# Patient Record
Sex: MALE | Race: WHITE | NOT HISPANIC OR LATINO | Employment: STUDENT | URBAN - METROPOLITAN AREA
[De-identification: names, ages, dates, MRNs, and addresses within clinical notes are randomized per-mention and may not be internally consistent; named-entity substitution may affect disease eponyms.]

---

## 2023-11-09 ENCOUNTER — TELEPHONE (OUTPATIENT)
Dept: GASTROENTEROLOGY | Facility: CLINIC | Age: 23
End: 2023-11-09
Payer: COMMERCIAL

## 2023-12-15 RX ORDER — FLUOXETINE HYDROCHLORIDE 20 MG/1
20 CAPSULE ORAL DAILY
COMMUNITY
End: 2023-12-20

## 2023-12-15 RX ORDER — DOCUSATE SODIUM 100 MG/1
CAPSULE, LIQUID FILLED ORAL
COMMUNITY
Start: 2023-05-09

## 2023-12-15 RX ORDER — CYPROHEPTADINE HYDROCHLORIDE 4 MG/1
8 TABLET ORAL NIGHTLY
COMMUNITY
End: 2024-04-30 | Stop reason: SDUPTHER

## 2023-12-18 ENCOUNTER — OFFICE VISIT (OUTPATIENT)
Dept: PEDIATRICS | Facility: CLINIC | Age: 23
End: 2023-12-18
Payer: COMMERCIAL

## 2023-12-18 VITALS
HEART RATE: 84 BPM | WEIGHT: 137.6 LBS | DIASTOLIC BLOOD PRESSURE: 85 MMHG | HEIGHT: 67 IN | SYSTOLIC BLOOD PRESSURE: 129 MMHG | BODY MASS INDEX: 21.6 KG/M2

## 2023-12-18 DIAGNOSIS — Z00.00 WELLNESS EXAMINATION: Primary | ICD-10-CM

## 2023-12-18 PROBLEM — R63.0 POOR APPETITE: Status: ACTIVE | Noted: 2023-12-18

## 2023-12-18 PROBLEM — J30.9 ALLERGIC RHINITIS: Status: ACTIVE | Noted: 2023-12-18

## 2023-12-18 PROBLEM — F32.A DEPRESSION: Status: ACTIVE | Noted: 2023-12-18

## 2023-12-18 PROBLEM — T78.3XXA ANGIOEDEMA OF LIPS: Status: ACTIVE | Noted: 2023-12-18

## 2023-12-18 PROBLEM — R62.7 POOR WEIGHT GAIN IN ADULT: Status: ACTIVE | Noted: 2023-12-18

## 2023-12-18 PROBLEM — E73.9 LACTOSE INTOLERANCE: Status: ACTIVE | Noted: 2023-12-18

## 2023-12-18 PROBLEM — F41.9 ANXIETY: Status: ACTIVE | Noted: 2023-12-18

## 2023-12-18 PROBLEM — Z91.010 PEANUT ALLERGY: Status: ACTIVE | Noted: 2023-12-18

## 2023-12-18 PROCEDURE — 3008F BODY MASS INDEX DOCD: CPT | Performed by: PEDIATRICS

## 2023-12-18 PROCEDURE — 1036F TOBACCO NON-USER: CPT | Performed by: PEDIATRICS

## 2023-12-18 PROCEDURE — 90471 IMMUNIZATION ADMIN: CPT | Performed by: PEDIATRICS

## 2023-12-18 PROCEDURE — 90686 IIV4 VACC NO PRSV 0.5 ML IM: CPT | Performed by: PEDIATRICS

## 2023-12-18 PROCEDURE — 99395 PREV VISIT EST AGE 18-39: CPT | Performed by: PEDIATRICS

## 2023-12-18 PROCEDURE — 91322 SARSCOV2 VAC 50 MCG/0.5ML IM: CPT | Performed by: PEDIATRICS

## 2023-12-18 PROCEDURE — 90480 ADMN SARSCOV2 VAC 1/ONLY CMP: CPT | Performed by: PEDIATRICS

## 2023-12-18 RX ORDER — CETIRIZINE HYDROCHLORIDE 10 MG/1
10 TABLET ORAL DAILY
COMMUNITY

## 2023-12-18 NOTE — PATIENT INSTRUCTIONS
Diagnoses and all orders for this visit:  Wellness examination  -     Flu vaccine (IIV4) age 6 months and greater, preservative free  -     Moderna COVID-19 vaccine, 8122-3514, monovalent, age 12 years and older, (50mcg/0.5mL)  BMI 21.0-21.9, adult       Assessment/Plan   Well adult.  1. Anticipatory guidance discussed.     2.  Weight management:  The patient was counseled regarding physical activity.  3. Development: appropriate for age   4. No orders of the defined types were placed in this encounter.      5. Follow-up visit in 1 year for next well child visit, or sooner as needed.

## 2023-12-18 NOTE — PROGRESS NOTES
Subjective   History was provided by jimmie  22yo who is here for this well visit.       Immunization History   Administered Date(s) Administered    DTaP vaccine, pediatric  (INFANRIX) 2002, 2002, 2002, 10/30/2003, 04/20/2006    Flu vaccine (IIV4), preservative free *Check age/dose* 11/30/2020    HPV, Quadrivalent 06/04/2014, 08/06/2014, 12/10/2014    Hepatitis A vaccine, age 19 years and greater (HAVRIX) 08/18/2021    Hepatitis A vaccine, pediatric/adolescent (HAVRIX, VAQTA) 07/13/2020    Hepatitis B vaccine, adult (RECOMBIVAX, ENGERIX) 2002, 2002, 01/20/2003    HiB PRP-T conjugate vaccine (HIBERIX, ACTHIB) 2002, 2002, 2002, 10/30/2003    MMR vaccine, subcutaneous (MMR II) 07/25/2003, 04/20/2006    Meningococcal ACWY vaccine (MENVEO) 07/08/2019    Meningococcal B vaccine (BEXSERO) 07/13/2020, 08/12/2020    Meningococcal MCV4P 08/06/2014    PPD Test 08/12/2020    Pneumococcal conjugate vaccine, 13-valent (PREVNAR 13) 2002, 2002, 04/21/2003    Poliovirus vaccine, subcutaneous (IPOL) 2002, 07/25/2003, 10/30/2003, 04/20/2006    Tdap vaccine, age 7 year and older (BOOSTRIX) 06/04/2014    Varicella vaccine, subcutaneous (VARIVAX) 04/21/2003, 04/23/2009             History of previous adverse reactions to immunizations? no  The following portions of the patient's history were reviewed by a provider in this encounter and updated as appropriate:  Tobacco  Allergies  Meds  Problems  Med Hx  Surg Hx  Fam Hx     Concerns: sick for a few days- cold- getting better.  Well  Assessment:  22yo lives with family   Nutrition  Types of intake include vegetables, fruits, meats, cow's milk and cereals.   Dental  The patient has a dental home. The patient brushes teeth regularly. The patient flosses regularly. Last dental exam was less than 6 months ago.   Elimination  Elimination problems do not include constipation, diarrhea or urinary symptoms. There is no bed  "wetting.   Behavioral  Behavioral issues do not include misbehaving with siblings.   Sleep  Average sleep duration is 7 hours. The patient does not snore. There are no sleep problems.   Safety  There is no smoking in the home. Home has working smoke alarms? yes. Home has working carbon monoxide alarms? yes.   School  Current grade level is college- [employer].  There are no signs of learning disabilities. Child is doing well in school.   Screening  There are no risk factors at school. There are no risk factors related to alcohol. There are no risk factors related to drugs. There are no risk factors related to personal safety. There are no risk factors related to tobacco.   Social  Sibling interactions are good.     Fun: hang out with friends, video games               Objective   Vitals   /85 (BP Location: Right arm, BP Cuff Size: Adult)   Pulse 84   Ht 1.702 m (5' 7\")   Wt 62.4 kg (137 lb 9.6 oz)   BMI 21.55 kg/m²       Growth parameters are noted and are appropriate for age.   Physical Exam  Constitutional:       Appearance: Normal appearance. He is normal weight.   HENT:      Head: Normocephalic and atraumatic.      Right Ear: Tympanic membrane normal.      Left Ear: Tympanic membrane normal.      Nose: Nose normal.      Mouth/Throat:      Mouth: Mucous membranes are moist.   Eyes:      Extraocular Movements: Extraocular movements intact.      Conjunctiva/sclera: Conjunctivae normal.      Pupils: Pupils are equal, round, and reactive to light.   Cardiovascular:      Rate and Rhythm: Normal rate and regular rhythm.      Heart sounds: Normal heart sounds.   Pulmonary:      Breath sounds: Normal breath sounds.   Abdominal:      General: Abdomen is flat. Bowel sounds are normal.      Palpations: Abdomen is soft.   Genitourinary:     Penis: Normal.       Testes: Normal.   Musculoskeletal:         General: Normal range of motion.      Cervical back: Normal range of motion and neck supple.   Skin:     General: Skin " is warm and dry.   Neurological:      General: No focal deficit present.      Mental Status: He is alert and oriented to person, place, and time. Mental status is at baseline.              Diagnoses and all orders for this visit:  Wellness examination  -     Flu vaccine (IIV4) age 6 months and greater, preservative free  -     Moderna COVID-19 vaccine, 9983-6312, monovalent, age 12 years and older, (50mcg/0.5mL)  BMI 21.0-21.9, adult       Assessment/Plan   Well adult.  1. Anticipatory guidance discussed.     2.  Weight management:  The patient was counseled regarding physical activity.  3. Development: appropriate for age   4. No orders of the defined types were placed in this encounter.      5. Follow-up visit in 1 year for next well child visit, or sooner as needed.

## 2023-12-19 DIAGNOSIS — F41.9 ANXIETY DISORDER, UNSPECIFIED: ICD-10-CM

## 2023-12-20 RX ORDER — FLUOXETINE HYDROCHLORIDE 20 MG/1
20 CAPSULE ORAL DAILY
Qty: 90 CAPSULE | Refills: 3 | Status: SHIPPED | OUTPATIENT
Start: 2023-12-20 | End: 2024-01-05 | Stop reason: SDUPTHER

## 2024-01-05 ENCOUNTER — TELEPHONE (OUTPATIENT)
Dept: PEDIATRICS | Facility: CLINIC | Age: 24
End: 2024-01-05
Payer: COMMERCIAL

## 2024-01-05 DIAGNOSIS — F41.9 ANXIETY DISORDER, UNSPECIFIED: ICD-10-CM

## 2024-01-05 RX ORDER — FLUOXETINE HYDROCHLORIDE 20 MG/1
40 CAPSULE ORAL DAILY
Qty: 180 CAPSULE | Refills: 3 | Status: SHIPPED | OUTPATIENT
Start: 2024-01-05 | End: 2024-06-03 | Stop reason: WASHOUT

## 2024-02-09 ENCOUNTER — TELEPHONE (OUTPATIENT)
Dept: PEDIATRICS | Facility: CLINIC | Age: 24
End: 2024-02-09
Payer: COMMERCIAL

## 2024-02-12 ENCOUNTER — OFFICE VISIT (OUTPATIENT)
Dept: PEDIATRICS | Facility: CLINIC | Age: 24
End: 2024-02-12
Payer: COMMERCIAL

## 2024-02-12 VITALS
WEIGHT: 137 LBS | DIASTOLIC BLOOD PRESSURE: 76 MMHG | HEART RATE: 106 BPM | BODY MASS INDEX: 21.46 KG/M2 | SYSTOLIC BLOOD PRESSURE: 119 MMHG

## 2024-02-12 DIAGNOSIS — F41.9 ANXIETY: Primary | ICD-10-CM

## 2024-02-12 PROCEDURE — 99213 OFFICE O/P EST LOW 20 MIN: CPT | Performed by: PEDIATRICS

## 2024-02-12 PROCEDURE — 1036F TOBACCO NON-USER: CPT | Performed by: PEDIATRICS

## 2024-02-12 PROCEDURE — 3008F BODY MASS INDEX DOCD: CPT | Performed by: PEDIATRICS

## 2024-02-12 RX ORDER — ESCITALOPRAM OXALATE 10 MG/1
10 TABLET ORAL DAILY
Qty: 30 TABLET | Refills: 2 | Status: SHIPPED | OUTPATIENT
Start: 2024-02-12 | End: 2024-03-05

## 2024-02-12 NOTE — PROGRESS NOTES
Subjective   Cruz Del Cid a 23 y.o.malewho presents forAnxiety (23 yr old here to discuss Prozac. Recently increased dose to 20 mg and does not like it, wanting to try another medication. /)  HPI    Increased fluoxetine over the phone and has been having bad panic attacks.not fun- change?  Eating and sleeping ok overall.  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     Objective   /76 (BP Location: Left arm, BP Cuff Size: Adult)   Pulse 106   Wt 62.1 kg (137 lb)   BMI 21.46 kg/m²       Physical Exam    General: Well-developed, well-nourished, alert and oriented, no acute distress  Eyes: Normal sclera, PERRLA, EOMI  ENT: Moist mucous membranes,  normal throat, no nasal discharge. TMs are normal.  Cardiac:  Normal S1/S2, no murmurs, regular rhythm. Capillary refill less than 2 seconds  Pulmonary: Clear to auscultation bilaterally, no work of breathing.  GI: normal abdomen without localization and without rebound or guarding.  Skin: No rashes  Neuro: Symmetric face, no ataxia, grossly normal strength.  Lymph: No lymphadenopathy         No visits with results within 10 Day(s) from this visit.   Latest known visit with results is:   [facility] Encounter on 07/17/2018   Component Date Value Ref Range Status    Pathology Report 07/17/2018    Final                    Value:Name CRUZ JORDAN                                                                                                   Accession #: [id number]            Pathologist:                   SIDDHARTHA YADAV MD  Date of Procedure:    7/17/2018  Date Received:          7/17/2018  Date Reported           7/18/2018  Submitting Physician:   ALPHONSE JETT MD  Location:                      Other External #                                                                    FINAL DIAGNOSIS  A.  ESOPHAGUS, BIOPSY:    --NO SIGNIFICANT HISTOPATHOLOGIC CHANGE.  --NEGATIVE FOR INTRAEPITHELIAL EOSINOPHILS.    B.  STOMACH,  "BIOPSY:  --REACTIVE (CHEMICAL) GASTROPATHY.  --NEGATIVE FOR HELICOBACTER PYLORI-LIKE ORGANISMS BY MORPHOLOGY.    C.  DUODENUM, BIOPSY:    --NORMAL VILLOUS ARCHITECTURE WITH NO SIGNIFICANT HISTOPATHOLOGIC CHANGE.    D.  TERMINAL ILEUM, BIOPSY:    --NORMAL VILLOUS ARCHITECTURE WITH NO SIGNIFICANT HISTOPATHOLOGIC CHANGE.    E.  COLON, RIGHT, BIOPSY:    --NO SIGNIFICANT HISTOPATHOL                          OGIC CHANGE.    F.  COLON, TRANSVERSE, BIOPSY:    --NO SIGNIFICANT HISTOPATHOLOGIC CHANGE.    G.  COLON, LEFT, BIOPSY:    --NO SIGNIFICANT HISTOPATHOLOGIC CHANGE.    H.  RECTUM, BIOPSY:  --NO SIGNIFICANT HISTOPATHOLOGIC CHANGE.                                                                                                                                                                                                                                                                                                                                                                                                                                                                                       Electronically Signed Out By SIDDHARTHA YADAV MD/STS  By the signature on this report, the individual or group listed as making the  Final Interpretation/Diagnosis certifies that they have reviewed this case.           Clinical History:  h/o ab.pain, weight > normal EGD/ colon    Specimens Submitted                           As:  A: E-ESOPHAGUS   B: G-GASTRIC   C: D-DUODENUM   D: TI-TERMINAL ILEUM   E: RC-RIGHT COLON   F: TC-TRANSVERSE COLON   G: LC-LEFT COLON   H: R-RECTUM     Gross Description:  A:  Received in formalin, labeled with the patient's name and hospital number  and \"E \", are 2 fragments of white, soft tissue aggregating to 0.2 x 0.1 x 0.1  cm.  The specimen is submitted in toto in one cassette.  EXL    B:  Received in formalin, labeled with the patient's name and hospital number  and \"G \", are multiple fragments of " "tan, soft tissue aggregating to 0.5 x 0.1 x  0.1 cm.  The specimen is submitted in toto in one cassette.  EXL    C:  Received in formalin, labeled with the patient's name and hospital number  and \"D \", are 2 fragments of tan, soft tissue aggregating to 0.3 x 0.1 x 0.1  cm.  The specimen is submitted in toto in one cassette.  EXL    D:  Received in formalin, labeled with the patient's name and hospital number  and \"TI \", is a fragment of tan, soft tissue measuring 0.3 x 0.1 x 0.1 c                          m.  The  specimen is submitted in toto in one cassette.  EXL    E:  Received in formalin, labeled with the patient's name and hospital number  and \"RC \", is a fragment of tan, soft tissue measuring 0.3 x 0.1 x 0.1 cm.  The  specimen is submitted in toto in one cassette.  EXL    F:  Received in formalin, labeled with the patient's name and hospital number  and \"TC \", are 2 fragments of tan, soft tissue aggregating to 0.2 x 0.1 x 0.1  cm.  The specimen is submitted in toto in one cassette.  EXL    G:  Received in formalin, labeled with the patient's name and hospital number  and \"LC \", are 2 fragments of tan, soft tissue aggregating to 0.4 x 0.1 x 0.1  cm.  The specimen is submitted in toto in one cassette.  EXL    H:  Received in formalin, labeled with the patient's name and hospital number  and \"R \", are 2 fragments of tan, soft tissue aggregating to 0.2 x 0.1 x 0.1  cm.  The specimen is submitted in toto in one cassette.  EXL    exl/7/17/2018               [facility]  Department of Pathology   [address] [place] [address]        CONVERTED FINAL DIAGNOSIS 07/17/2018    Final                    Value:A.  ESOPHAGUS, BIOPSY:    --NO SIGNIFICANT HISTOPATHOLOGIC CHANGE.  --NEGATIVE FOR INTRAEPITHELIAL EOSINOPHILS.    B.  STOMACH, BIOPSY:  --REACTIVE (CHEMICAL) GASTROPATHY.  --NEGATIVE FOR HELICOBACTER PYLORI-LIKE ORGANISMS BY MORPHOLOGY.    C.  " "DUODENUM, BIOPSY:    --NORMAL VILLOUS ARCHITECTURE WITH NO SIGNIFICANT HISTOPATHOLOGIC CHANGE.    D.  TERMINAL ILEUM, BIOPSY:    --NORMAL VILLOUS ARCHITECTURE WITH NO SIGNIFICANT HISTOPATHOLOGIC CHANGE.    E.  COLON, RIGHT, BIOPSY:    --NO SIGNIFICANT HISTOPATHOLOGIC CHANGE.    F.  COLON, TRANSVERSE, BIOPSY:    --NO SIGNIFICANT HISTOPATHOLOGIC CHANGE.    G.  COLON, LEFT, BIOPSY:    --NO SIGNIFICANT HISTOPATHOLOGIC CHANGE.    H.  RECTUM, BIOPSY:  --NO SIGNIFICANT HISTOPATHOLOGIC CHANGE.        CONVERTED CLINICAL DIAGNOSIS-HISTO* 07/17/2018 h/o ab.pain, weight > normal EGD/ colon   Final    CONVERTED GROSS DESCRIPTION 07/17/2018    Final                    Value:A:  Received in formalin, labeled with the patient's name and hospital number  and \"E \", are 2 fragments of white, soft tissue aggregating to 0.2 x 0.1 x 0.1  cm.  The specimen is submitted in toto in one cassette.  EXL    B:  Received in formalin, labeled with the patient's name and hospital number  and \"G \", are multiple fragments of tan, soft tissue aggregating to 0.5 x 0.1 x  0.1 cm.  The specimen is submitted in toto in one cassette.  EXL    C:  Received in formalin, labeled with the patient's name and hospital number  and \"D \", are 2 fragments of tan, soft tissue aggregating to 0.3 x 0.1 x 0.1  cm.  The specimen is submitted in toto in one cassette.  EXL    D:  Received in formalin, labeled with the patient's name and hospital number  and \"TI \", is a fragment of tan, soft tissue measuring 0.3 x 0.1 x 0.1 cm.  The  specimen is submitted in toto in one cassette.  EXL    E:  Received in formalin, labeled with the patient's name and hospital number  and \"RC \", is a fragment of tan, s                          oft tissue measuring 0.3 x 0.1 x 0.1 cm.  The  specimen is submitted in toto in one cassette.  EXL    F:  Received in formalin, labeled with the patient's name and hospital number  and \"TC \", are 2 fragments of tan, soft tissue aggregating to 0.2 x 0.1 x " "0.1  cm.  The specimen is submitted in toto in one cassette.  EXL    G:  Received in formalin, labeled with the patient's name and hospital number  and \"LC \", are 2 fragments of tan, soft tissue aggregating to 0.4 x 0.1 x 0.1  cm.  The specimen is submitted in toto in one cassette.  EXL    H:  Received in formalin, labeled with the patient's name and hospital number  and \"R \", are 2 fragments of tan, soft tissue aggregating to 0.2 x 0.1 x 0.1  cm.  The specimen is submitted in toto in one cassette.  EXL      CONVERTED FINAL REPORT PDF LINK TO* 07/17/2018 \\[id number]   Final         Assessment/Plan   Diagnoses and all orders for this visit:  Anxiety  -     escitalopram (Lexapro) 10 mg tablet; Take 1 tablet (10 mg) by mouth once daily.    "

## 2024-02-12 NOTE — PATIENT INSTRUCTIONS
Diagnoses and all orders for this visit:  Anxiety  -     escitalopram (Lexapro) 10 mg tablet; Take 1 tablet (10 mg) by mouth once daily.

## 2024-04-24 PROBLEM — K59.2 CONSTIPATION DUE TO NEUROGENIC BOWEL: Status: ACTIVE | Noted: 2024-04-24

## 2024-04-24 PROBLEM — K59.00 CONSTIPATION DUE TO NEUROGENIC BOWEL: Status: ACTIVE | Noted: 2024-04-24

## 2024-04-29 NOTE — PROGRESS NOTES
REASON FOR VISIT:  Constipation     HPI:  Cruz Ramirez is a 24 y.o. male who presents for 1 year follow up     Overall feeling well   Currently moving bowels once daily - denies straining , feels like fully evacuating  Denies hematochezia, melena, or mucus   Denies N/V   Weight has been fluctuating between 130-135 over the last 3-4 months   Appetite has improved          Med list notable for cyproheptadine 8mg daily     Labs notable for    No fhx of CRC.       Prev endoscopic eval:   EGD/Colon 2018     REVIEW OF SYSTEMS    Review of Systems   Constitutional: Negative for decreased appetite.   Cardiovascular:  Negative for chest pain.   Respiratory:  Negative for cough and shortness of breath.    Gastrointestinal:  Negative for bloating, abdominal pain, constipation, diarrhea, dysphagia, flatus, heartburn, hematemesis, hematochezia, hemorrhoids, jaundice, melena, nausea and vomiting.          Allergies   Allergen Reactions    Amoxicillin-Pot Clavulanate Unknown    Cefprozil Unknown       Past Medical History:   Diagnosis Date    Myopia, unspecified eye 07/12/2016    Axial myopia    Other specified health status     No pertinent past medical history    Unspecified astigmatism, unspecified eye 07/12/2016    Astigmatism       Past Surgical History:   Procedure Laterality Date    OTHER SURGICAL HISTORY  07/16/2014    Dental Surgery       No family history on file.    Social History     Tobacco Use    Smoking status: Never    Smokeless tobacco: Never   Substance Use Topics    Alcohol use: Not on file       Current Outpatient Medications   Medication Sig Dispense Refill    cetirizine (ZyrTEC) 10 mg tablet Take 1 tablet (10 mg) by mouth once daily.      cyproheptadine (Periactin) 4 mg tablet Take 2 tablets (8 mg) by mouth once daily at bedtime.      docusate sodium (Colace) 100 mg capsule Take by mouth.      escitalopram (Lexapro) 10 mg tablet TAKE 1 TABLET BY MOUTH EVERY DAY 90 tablet 0    FLUoxetine (PROzac) 20 mg  capsule Take 2 capsules (40 mg) by mouth once daily. 180 capsule 3     No current facility-administered medications for this visit.       PHYSICAL EXAM:  There were no vitals taken for this visit.     Physical Exam  Constitutional:       Comments: Awake   HENT:      Head: Normocephalic.   Cardiovascular:      Rate and Rhythm: Normal rate and regular rhythm.   Pulmonary:      Effort: Pulmonary effort is normal.      Breath sounds: Normal breath sounds.   Abdominal:      General: Bowel sounds are normal.      Palpations: Abdomen is soft.   Neurological:      Mental Status: He is alert.   Psychiatric:         Mood and Affect: Mood normal.          ASSESSMENT  24-year-old male presents for annual follow-up for longstanding abdominal pain, constipation, poor appetite.  He was previously followed by pediatric GI and was started on cyproheptadine (Periactin) for his symptoms which seem to have managed them over the years.  He reports a daily bowel movement denies any side effects or abdominal pain.  His weight has been stable per his home scale but has been noted to be variable on our scales.  His last visit a year ago he was documented at 150 pounds and today he is 130 pounds.  He denies any changes in his symptoms but reports he is working out more.  This point we will continue with his current medications and I will see him in follow-up in 1 year or sooner if needed      Evaluation requested by Dr. Arpan Burch MD.   My final recommendations will be communicated back to the requesting physician by way of shared Medical record or letter to requesting physician via fax.      Attending Note:   I have personally performed a face to face assessment of this Patient, which included an interview, physical exam and Assessment and Plan.  I have reviewed and confirmed the history and key findings as documented by the Medical Assistant and edited as appropriate.     Signature: Varsha Leung MD    Date: 4/29/2024  Time: 1:45  PM

## 2024-04-30 ENCOUNTER — OFFICE VISIT (OUTPATIENT)
Dept: GASTROENTEROLOGY | Facility: CLINIC | Age: 24
End: 2024-04-30
Payer: COMMERCIAL

## 2024-04-30 VITALS
HEART RATE: 92 BPM | WEIGHT: 130 LBS | DIASTOLIC BLOOD PRESSURE: 67 MMHG | SYSTOLIC BLOOD PRESSURE: 115 MMHG | HEIGHT: 67 IN | BODY MASS INDEX: 20.4 KG/M2 | RESPIRATION RATE: 16 BRPM | OXYGEN SATURATION: 98 % | TEMPERATURE: 97.7 F

## 2024-04-30 DIAGNOSIS — K59.00 CONSTIPATION DUE TO NEUROGENIC BOWEL: Primary | ICD-10-CM

## 2024-04-30 DIAGNOSIS — K59.2 CONSTIPATION DUE TO NEUROGENIC BOWEL: Primary | ICD-10-CM

## 2024-04-30 PROCEDURE — 99213 OFFICE O/P EST LOW 20 MIN: CPT | Performed by: INTERNAL MEDICINE

## 2024-04-30 PROCEDURE — 1036F TOBACCO NON-USER: CPT | Performed by: INTERNAL MEDICINE

## 2024-04-30 PROCEDURE — 3008F BODY MASS INDEX DOCD: CPT | Performed by: INTERNAL MEDICINE

## 2024-04-30 RX ORDER — CYPROHEPTADINE HYDROCHLORIDE 4 MG/1
8 TABLET ORAL NIGHTLY
Qty: 180 TABLET | Refills: 3 | Status: SHIPPED | OUTPATIENT
Start: 2024-04-30 | End: 2025-04-30

## 2024-04-30 ASSESSMENT — ENCOUNTER SYMPTOMS
CONSTIPATION: 0
DIARRHEA: 0
DECREASED APPETITE: 0
BLOATING: 0
FLATUS: 0
HEMATOCHEZIA: 0
VOMITING: 0
NAUSEA: 0
COUGH: 0
HEMATEMESIS: 0
JAUNDICE: 0
ABDOMINAL PAIN: 0
HEARTBURN: 0
SHORTNESS OF BREATH: 0

## 2024-06-03 ENCOUNTER — TELEPHONE (OUTPATIENT)
Dept: PEDIATRICS | Facility: CLINIC | Age: 24
End: 2024-06-03
Payer: COMMERCIAL

## 2024-06-03 DIAGNOSIS — F41.9 ANXIETY: ICD-10-CM

## 2024-06-03 RX ORDER — ESCITALOPRAM OXALATE 10 MG/1
10 TABLET ORAL DAILY
Qty: 90 TABLET | Refills: 0 | Status: SHIPPED | OUTPATIENT
Start: 2024-06-03

## 2024-08-28 DIAGNOSIS — F41.9 ANXIETY: ICD-10-CM

## 2024-08-28 RX ORDER — ESCITALOPRAM OXALATE 10 MG/1
10 TABLET ORAL DAILY
Qty: 90 TABLET | Refills: 0 | Status: SHIPPED | OUTPATIENT
Start: 2024-08-28

## 2024-11-12 ENCOUNTER — OFFICE VISIT (OUTPATIENT)
Dept: PEDIATRICS | Facility: CLINIC | Age: 24
End: 2024-11-12
Payer: COMMERCIAL

## 2024-11-12 VITALS
HEART RATE: 79 BPM | TEMPERATURE: 98.2 F | DIASTOLIC BLOOD PRESSURE: 75 MMHG | SYSTOLIC BLOOD PRESSURE: 114 MMHG | WEIGHT: 134.8 LBS | BODY MASS INDEX: 21.16 KG/M2 | HEIGHT: 67 IN

## 2024-11-12 DIAGNOSIS — K52.9 ACUTE GASTROENTERITIS: ICD-10-CM

## 2024-11-12 DIAGNOSIS — R52 GENERALIZED BODY ACHES: Primary | ICD-10-CM

## 2024-11-12 PROCEDURE — 3008F BODY MASS INDEX DOCD: CPT | Performed by: PEDIATRICS

## 2024-11-12 PROCEDURE — 99214 OFFICE O/P EST MOD 30 MIN: CPT | Performed by: PEDIATRICS

## 2024-11-12 PROCEDURE — 87636 SARSCOV2 & INF A&B AMP PRB: CPT

## 2024-11-12 RX ORDER — ONDANSETRON 4 MG/1
4 TABLET, ORALLY DISINTEGRATING ORAL EVERY 8 HOURS PRN
Qty: 20 TABLET | Refills: 0 | Status: SHIPPED | OUTPATIENT
Start: 2024-11-12 | End: 2024-11-19

## 2024-11-12 NOTE — PROGRESS NOTES
"Subjective   Patient ID: Cruz Ramirez is a 24 y.o. male.    HPI  History obtained from patient. Here today with abd pain and body aches. Symptoms started two days ago with diarrhea. He had several episodes yesterday. Used imodium with relief. He has some nausea but not vomiting. No fevers. Eating less and drinking ok. Now has some congestion.     Review of Systems  ROS otherwise negative.     Objective   Physical Exam  Visit Vitals  /75 (BP Location: Left arm, BP Cuff Size: Large adult)   Pulse 79   Temp 36.8 °C (98.2 °F) (Oral)   Ht 1.702 m (5' 7\")   Wt 61.1 kg (134 lb 12.8 oz)   BMI 21.11 kg/m²   Smoking Status Never   BSA 1.7 m²   alert and active; head atraumatic/normocephalic; sammy; tms clear; mmm; no erythema or exudate; clear rhinorrhea/congestion; neck supple with no lad; lungs clear; rrr; no murmur; abd soft/nt/nd; no rashes      Assessment/Plan   Diagnoses and all orders for this visit:  Generalized body aches  -     Sars-CoV-2 PCR; Future  -     Influenza A, and B PCR; Future  Acute gastroenteritis  -     ondansetron ODT (Zofran-ODT) 4 mg disintegrating tablet; Take 1 tablet (4 mg) by mouth every 8 hours if needed for nausea or vomiting for up to 7 days.  Here today for gastroenteritis. Discussed supportive care at home. Discussed dehydration and what to watch for. Discussed diet and fluids during acute illness. Discussed course of illness and what to expect. Discussed that this may lasts for up to 7-10 days. If continues for longer than 7-10 days or develops fever, blood in stool, etc parents should call. Will call with concerns.  Will do covid and flu testing given body aches. Will call with results in am.     "

## 2024-11-13 ENCOUNTER — TELEPHONE (OUTPATIENT)
Dept: PEDIATRICS | Facility: CLINIC | Age: 24
End: 2024-11-13
Payer: COMMERCIAL

## 2024-11-13 LAB
FLUAV RNA RESP QL NAA+PROBE: NOT DETECTED
FLUBV RNA RESP QL NAA+PROBE: NOT DETECTED
SARS-COV-2 RNA RESP QL NAA+PROBE: NOT DETECTED

## 2024-11-23 DIAGNOSIS — F41.9 ANXIETY: ICD-10-CM

## 2024-11-23 RX ORDER — ESCITALOPRAM OXALATE 10 MG/1
10 TABLET ORAL DAILY
Qty: 90 TABLET | Refills: 0 | Status: SHIPPED | OUTPATIENT
Start: 2024-11-23

## 2024-12-20 ENCOUNTER — APPOINTMENT (OUTPATIENT)
Dept: PEDIATRICS | Facility: CLINIC | Age: 24
End: 2024-12-20
Payer: COMMERCIAL

## 2024-12-20 VITALS
HEIGHT: 67 IN | BODY MASS INDEX: 20.91 KG/M2 | DIASTOLIC BLOOD PRESSURE: 79 MMHG | HEART RATE: 89 BPM | WEIGHT: 133.2 LBS | SYSTOLIC BLOOD PRESSURE: 122 MMHG

## 2024-12-20 DIAGNOSIS — Z23 ENCOUNTER FOR IMMUNIZATION: ICD-10-CM

## 2024-12-20 DIAGNOSIS — Z00.00 WELLNESS EXAMINATION: Primary | ICD-10-CM

## 2024-12-20 PROBLEM — Z91.010 PEANUT ALLERGY: Status: RESOLVED | Noted: 2023-12-18 | Resolved: 2024-12-20

## 2024-12-20 PROCEDURE — 90480 ADMN SARSCOV2 VAC 1/ONLY CMP: CPT | Performed by: PEDIATRICS

## 2024-12-20 PROCEDURE — 1036F TOBACCO NON-USER: CPT | Performed by: PEDIATRICS

## 2024-12-20 PROCEDURE — 3008F BODY MASS INDEX DOCD: CPT | Performed by: PEDIATRICS

## 2024-12-20 PROCEDURE — 91320 SARSCV2 VAC 30MCG TRS-SUC IM: CPT | Performed by: PEDIATRICS

## 2024-12-20 PROCEDURE — 90656 IIV3 VACC NO PRSV 0.5 ML IM: CPT | Performed by: PEDIATRICS

## 2024-12-20 PROCEDURE — 99395 PREV VISIT EST AGE 18-39: CPT | Performed by: PEDIATRICS

## 2024-12-20 PROCEDURE — 90471 IMMUNIZATION ADMIN: CPT | Performed by: PEDIATRICS

## 2024-12-20 NOTE — PROGRESS NOTES
Subjective   History was provided by mom/self  23 yo who is here for this well  visit.       Immunization History   Administered Date(s) Administered    DTaP vaccine, pediatric  (INFANRIX) 2002, 2002, 2002, 10/30/2003, 04/20/2006    DTaP, Unspecified 2002, 2002, 2002, 10/30/2003, 04/20/2006    Flu vaccine (IIV4), preservative free *Check age/dose* 11/30/2020, 12/23/2021, 11/21/2022, 12/18/2023    HPV, Quadrivalent 06/04/2014, 08/06/2014, 12/10/2014    Hep B, Unspecified 2002, 2002, 01/20/2003    Hepatitis A vaccine, age 19 years and greater (HAVRIX) 08/18/2021    Hepatitis A vaccine, pediatric/adolescent (HAVRIX, VAQTA) 07/13/2020    Hepatitis B vaccine, adult *Check Product/Dose* 2002, 2002, 01/20/2003    HiB PRP-T conjugate vaccine (HIBERIX, ACTHIB) 2002, 2002, 2002, 10/30/2003    MMR vaccine, subcutaneous (MMR II) 07/25/2003, 04/20/2006    Meningococcal ACWY vaccine (MENVEO) 07/08/2019    Meningococcal ACWY-D (Menactra) 4-valent conjugate vaccine 08/06/2014    Meningococcal B vaccine (BEXSERO) 07/13/2020, 08/12/2020    Moderna COVID-19 vaccine, 12 years and older (50mcg/0.5mL)(Spikevax) 12/18/2023    Moderna COVID-19 vaccine, bivalent, blue cap/gray label *Check age/dose* 11/21/2022    PPD Test 08/12/2020    Pfizer Purple Cap SARS-CoV-2 05/04/2021, 06/01/2021, 01/04/2022    Pneumococcal Conjugate, Unspecified 2002, 2002, 04/21/2003    Pneumococcal conjugate vaccine, 13-valent (PREVNAR 13) 2002, 2002, 04/21/2003    Poliovirus vaccine, subcutaneous (IPOL) 2002, 07/25/2003, 10/30/2003, 04/20/2006    SARS-CoV-2, Unspecified 05/04/2021, 06/01/2021, 01/04/2022    Tdap vaccine, age 7 year and older (BOOSTRIX, ADACEL) 06/04/2014    Varicella vaccine, subcutaneous (VARIVAX) 04/21/2003, 04/23/2009             History of previous adverse reactions to immunizations? no  The following portions of the patient's history  "were reviewed by a provider in this encounter and updated as appropriate:  Tobacco  Allergies  Meds  Problems  Med Hx  Surg Hx  Fam Hx     Concerns: none  Well  Assessment:  23 yo  lives with family   Nutrition  Types of intake include vegetables, fruits, meats, cow's milk and cereals.   Dental  The patient has a dental home. The patient brushes teeth regularly. The patient flosses regularly. Last dental exam was less than 6 months ago.   Elimination  Elimination problems do not include constipation, diarrhea or urinary symptoms. There is no bed wetting.   Behavioral  Behavioral issues do not include misbehaving with siblings.   Sleep  Average sleep duration is 7 hours. The patient does not snore. There are no sleep problems.   Safety  There is no smoking in the home. Home has working smoke alarms? yes. Home has working carbon monoxide alarms? yes.   School  To be at [employer], graduated [employer], working at [employer].   Screening  There are no risk factors at school. There are no risk factors related to alcohol. There are no risk factors related to drugs. There are no risk factors related to personal safety. There are no risk factors related to tobacco.   Social  Sibling interactions are good.     Fun: friends, video games, reading               Objective   Vitals   /79   Pulse 89   Ht 1.702 m (5' 7\")   Wt 60.4 kg (133 lb 3.2 oz)   BMI 20.86 kg/m²       Growth parameters are noted and are appropriate for age.   Physical Exam  Constitutional:       Appearance: Normal appearance. He is normal weight.   HENT:      Head: Normocephalic and atraumatic.      Right Ear: Tympanic membrane normal.      Left Ear: Tympanic membrane normal.      Nose: Nose normal.      Mouth/Throat:      Mouth: Mucous membranes are moist.   Eyes:      Extraocular Movements: Extraocular movements intact.      Conjunctiva/sclera: Conjunctivae normal.      Pupils: Pupils are equal, round, and reactive to light. "   Cardiovascular:      Rate and Rhythm: Normal rate and regular rhythm.      Heart sounds: Normal heart sounds.   Pulmonary:      Breath sounds: Normal breath sounds.   Abdominal:      General: Abdomen is flat. Bowel sounds are normal.      Palpations: Abdomen is soft.   Genitourinary:     Penis: Normal.       Testes: Normal.   Musculoskeletal:         General: Normal range of motion.      Cervical back: Normal range of motion and neck supple.   Skin:     General: Skin is warm and dry.   Neurological:      General: No focal deficit present.      Mental Status: He is alert and oriented to person, place, and time. Mental status is at baseline.              Diagnoses and all orders for this visit:  Wellness examination  Encounter for immunization  -     Flu vaccine, trivalent, preservative free, age 6 months and greater (Fluarix/Fluzone/Flulaval)  -     Pfizer COVID-19 vaccine, monovalent, age 12 years and older (30 mcg/0.3 mL) (Comirnaty)       Assessment/Plan   Well adult.  1. Anticipatory guidance discussed.   2.  Weight management:  The patient was counseled regarding physical activity.  3. Development: appropriate for age   4. No orders of the defined types were placed in this encounter.      5. Follow-up visit in 1 year for next well  visit, or sooner as needed.

## 2024-12-20 NOTE — PATIENT INSTRUCTIONS
Diagnoses and all orders for this visit:  Wellness examination  Encounter for immunization  -     Flu vaccine, trivalent, preservative free, age 6 months and greater (Fluarix/Fluzone/Flulaval)  -     Pfizer COVID-19 vaccine, monovalent, age 12 years and older (30 mcg/0.3 mL) (Comirnaty)       Assessment/Plan   Well adult.  1. Anticipatory guidance discussed.   2.  Weight management:  The patient was counseled regarding physical activity.  3. Development: appropriate for age   4. No orders of the defined types were placed in this encounter.      5. Follow-up visit in 1 year for next well  visit, or sooner as needed.

## 2025-01-15 ENCOUNTER — TELEPHONE (OUTPATIENT)
Dept: PEDIATRICS | Facility: CLINIC | Age: 25
End: 2025-01-15
Payer: COMMERCIAL

## 2025-01-15 DIAGNOSIS — H10.023 PINK EYE DISEASE OF BOTH EYES: Primary | ICD-10-CM

## 2025-01-15 RX ORDER — OFLOXACIN 3 MG/ML
2 SOLUTION/ DROPS OPHTHALMIC 2 TIMES DAILY
Qty: 5 ML | Refills: 0 | Status: SHIPPED | OUTPATIENT
Start: 2025-01-15 | End: 2025-01-20

## 2025-05-06 ENCOUNTER — APPOINTMENT (OUTPATIENT)
Dept: GASTROENTEROLOGY | Facility: CLINIC | Age: 25
End: 2025-05-06
Payer: COMMERCIAL

## 2025-05-06 VITALS
BODY MASS INDEX: 22.13 KG/M2 | DIASTOLIC BLOOD PRESSURE: 72 MMHG | RESPIRATION RATE: 18 BRPM | HEIGHT: 67 IN | WEIGHT: 141 LBS | HEART RATE: 96 BPM | OXYGEN SATURATION: 99 % | TEMPERATURE: 97.8 F | SYSTOLIC BLOOD PRESSURE: 123 MMHG

## 2025-05-06 DIAGNOSIS — K59.00 CONSTIPATION DUE TO NEUROGENIC BOWEL: Primary | ICD-10-CM

## 2025-05-06 DIAGNOSIS — K59.2 CONSTIPATION DUE TO NEUROGENIC BOWEL: Primary | ICD-10-CM

## 2025-05-06 PROCEDURE — 3008F BODY MASS INDEX DOCD: CPT | Performed by: INTERNAL MEDICINE

## 2025-05-06 PROCEDURE — 99213 OFFICE O/P EST LOW 20 MIN: CPT | Performed by: INTERNAL MEDICINE

## 2025-05-06 RX ORDER — CYPROHEPTADINE HYDROCHLORIDE 4 MG/1
8 TABLET ORAL NIGHTLY
Qty: 180 TABLET | Refills: 3 | Status: SHIPPED | OUTPATIENT
Start: 2025-05-06 | End: 2026-05-06

## 2025-05-06 ASSESSMENT — ENCOUNTER SYMPTOMS
VOMITING: 0
HEARTBURN: 0
COUGH: 0
WEIGHT LOSS: 0
CHILLS: 0
JAUNDICE: 0
SHORTNESS OF BREATH: 0
HEMATOCHEZIA: 0
BLOATING: 0
HEMATEMESIS: 0
NAUSEA: 0
ABDOMINAL PAIN: 0
FEVER: 0

## 2025-05-06 NOTE — PROGRESS NOTES
REASON FOR VISIT:  Constipation     HPI:  Cruz Ramirez is a 25 y.o. male who presents for a 1 year follow up     Overall feeling well   Currently moving bowels once daily -takes periactin and stool softener - denies straining , feels like fully evacuating  Denies hematochezia, melena, or mucus   Denies N/V   Weight up to 141lbs at todays visit - he has been stable on home scale  Slight decrease in appetite . Will stop periactin if he notices and then appetite improves   He eats a fiber in his diet - does not track         Med list notable for periactin -8mg at bedtime , colace     Labs notable for    No fhx of CRC.       Prev endoscopic eval:   EGD/Colon 2018     REVIEW OF SYSTEMS    Review of Systems   Constitutional: Negative for chills, fever and weight loss.   Cardiovascular:  Negative for chest pain.   Respiratory:  Negative for cough and shortness of breath.    Gastrointestinal:  Negative for bloating, abdominal pain, dysphagia, heartburn, hematemesis, hematochezia, jaundice, melena, nausea and vomiting.          Allergies[1]    Medical History[2]    Surgical History[3]    Family History[4]    Social History     Tobacco Use    Smoking status: Never    Smokeless tobacco: Never   Substance Use Topics    Alcohol use: Yes     Comment: occasionally       Current Medications[5]    PHYSICAL EXAM:  There were no vitals taken for this visit.     Physical Exam  Constitutional:       Comments: Awake   HENT:      Head: Normocephalic.   Cardiovascular:      Rate and Rhythm: Normal rate and regular rhythm.   Pulmonary:      Effort: Pulmonary effort is normal.      Breath sounds: Normal breath sounds.   Abdominal:      General: Bowel sounds are normal.      Palpations: Abdomen is soft.   Neurological:      Mental Status: He is alert.   Psychiatric:         Mood and Affect: Mood normal.          ASSESSMENT  25-year-old male presents for annual follow-up for longstanding abdominal pain, constipation, poor appetite.  He  was previously followed by pediatric GI and was started on cyproheptadine (Periactin) for his symptoms which seem to have managed them over the years.  He reports a daily bowel movement denies any side effects or abdominal pain.  His weight has been stable He denies any changes in his symptoms but reports he is working out more.  This point we will continue with his current medications and I will see him in follow-up in 1 year or sooner if needed   I will send him home with instructions to monitor his fiber intake and keep it at 20 to 25 g/day throughout the day.  His prescriptions were refilled      Evaluation requested by Dr. Arpan Burch MD.   My final recommendations will be communicated back to the requesting physician by way of shared Medical record or letter to requesting physician via fax.      Attending Note:   I have personally performed a face to face assessment of this Patient, which included an interview, physical exam and Assessment and Plan.  I have reviewed and confirmed the history and key findings as documented by the Medical Assistant and edited as appropriate.     Signature: Varsha Leung MD    Date: 5/6/2025  Time: 1:22 PM         [1]   Allergies  Allergen Reactions    Amoxicillin-Pot Clavulanate Unknown    Cefprozil Unknown   [2]   Past Medical History:  Diagnosis Date    Myopia, unspecified eye 07/12/2016    Axial myopia    Other specified health status     No pertinent past medical history    Unspecified astigmatism, unspecified eye 07/12/2016    Astigmatism   [3]   Past Surgical History:  Procedure Laterality Date    OTHER SURGICAL HISTORY  07/16/2014    Dental Surgery   [4] No family history on file.  [5]   Current Outpatient Medications   Medication Sig Dispense Refill    cetirizine (ZyrTEC) 10 mg tablet Take 1 tablet (10 mg) by mouth once daily.      docusate sodium (Colace) 100 mg capsule Take by mouth.      escitalopram (Lexapro) 10 mg tablet TAKE 1 TABLET BY MOUTH EVERY DAY 90  tablet 0     No current facility-administered medications for this visit.

## 2025-05-18 DIAGNOSIS — F41.9 ANXIETY: ICD-10-CM

## 2025-05-19 RX ORDER — ESCITALOPRAM OXALATE 10 MG/1
10 TABLET ORAL DAILY
Qty: 90 TABLET | Refills: 0 | Status: SHIPPED | OUTPATIENT
Start: 2025-05-19

## 2025-08-14 DIAGNOSIS — F41.9 ANXIETY: ICD-10-CM

## 2025-08-14 RX ORDER — ESCITALOPRAM OXALATE 10 MG/1
10 TABLET ORAL DAILY
Qty: 90 TABLET | Refills: 0 | Status: SHIPPED | OUTPATIENT
Start: 2025-08-14